# Patient Record
Sex: FEMALE | Race: BLACK OR AFRICAN AMERICAN | Employment: FULL TIME | URBAN - METROPOLITAN AREA
[De-identification: names, ages, dates, MRNs, and addresses within clinical notes are randomized per-mention and may not be internally consistent; named-entity substitution may affect disease eponyms.]

---

## 2020-12-01 ENCOUNTER — HOSPITAL ENCOUNTER (OUTPATIENT)
Dept: PHYSICAL THERAPY | Age: 50
Discharge: HOME | End: 2020-12-01
Payer: COMMERCIAL

## 2020-12-01 PROCEDURE — 97530 THERAPEUTIC ACTIVITIES: CPT

## 2020-12-01 PROCEDURE — 97162 PT EVAL MOD COMPLEX 30 MIN: CPT

## 2020-12-01 NOTE — PROGRESS NOTES
PT DAILY TREATMENT NOTE 10-18    Patient Name: Shahana Nguyen  Date:2020  : 1970  [x]  Patient  Verified  Payor: Andrew Schumacher / Plan: Hugo Rm / Product Type: Commerical /    In time:3:05  Out time:4:00  Total Treatment Time (min): 55  Visit #: 1 of 10    Medicare/BCBS Only   Total Timed Codes (min):  30 1:1 Treatment Time:  55       Treatment Area: Neck pain [M54.2]  Pain in right shoulder [M25.511]  Pain in left shoulder [M25.512]  Left hip pain [M25.552]  Right hip pain [M25.551]    SUBJECTIVE  Pain Level (0-10 scale): 5-6  Any medication changes, allergies to medications, adverse drug reactions, diagnosis change, or new procedure performed?: [x] No    [] Yes (see summary sheet for update)  Subjective functional status/changes:   [] No changes reported  See POC    OBJECTIVE    25 min [x]Eval                  []Re-Eval     30 min Therapeutic Activity:  []  See flow sheet : Patient education on therapy assessment, prognosis, expectations for therapy sessions, patient goals, neuro screen results, symptom modification with exercise, normal healing times, role of HTN and DM II in creating visual disturbances, and HEP. Rationale: to improve the patients ability to adhere to HEP and therapy sessions for increased compliance when working toward therapy goals.           With   [] TE   [x] TA   [] neuro   [] other: Patient Education: [x] Review HEP    [] Progressed/Changed HEP based on:   [] positioning   [] body mechanics   [] transfers   [] heat/ice application    [] other:      Other Objective/Functional Measures: See POC     Pain Level (0-10 scale) post treatment: 5-6    ASSESSMENT/Changes in Function: See POC    Patient will continue to benefit from skilled PT services to modify and progress therapeutic interventions, address functional mobility deficits, address ROM deficits, address strength deficits, analyze and address soft tissue restrictions, analyze and cue movement patterns, analyze and modify body mechanics/ergonomics, assess and modify postural abnormalities, address imbalance/dizziness and instruct in home and community integration to attain remaining goals.      [x]  See Plan of Care  []  See progress note/recertification  []  See Discharge Summary         Progress towards goals / Updated goals:  See POC    PLAN  [x]  Upgrade activities as tolerated     []  Continue plan of care  [x]  Update interventions per flow sheet       []  Discharge due to:_  []  Other:_      Kristine Ramirez 12/1/2020  2:54 PM    Future Appointments   Date Time Provider Judy Mayers   12/1/2020  3:00 PM Keon Osorio
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
## 2020-12-09 ENCOUNTER — HOSPITAL ENCOUNTER (OUTPATIENT)
Dept: PHYSICAL THERAPY | Age: 50
Discharge: HOME | End: 2020-12-09
Payer: COMMERCIAL

## 2020-12-09 PROCEDURE — 97014 ELECTRIC STIMULATION THERAPY: CPT

## 2020-12-09 PROCEDURE — 97110 THERAPEUTIC EXERCISES: CPT

## 2020-12-09 NOTE — PROGRESS NOTES
PT DAILY TREATMENT NOTE     Patient Name: Betty Piña  Date:2020  : 1970  [x]  Patient  Verified  Payor: Mynor Rothman / Plan: Nan Solo / Product Type: Commerical /    In time:2:20  Out time:3:20  1:1 Treatment Time (min): 60  Visit #: 2 of 10    Medicare/BCBS Only   Total Timed Codes (min):  50 1:1 Treatment Time:  50       Treatment Area: Neck pain [M54.2]  Pain in left shoulder [M25.512]  Pain in thoracic spine [M54.6]    SUBJECTIVE  Pain Level (0-10 scale): 8/10 back, 5/10 left shoulder  Any medication changes, allergies to medications, adverse drug reactions, diagnosis change, or new procedure performed?: [x] No    [] Yes (see summary sheet for update)  Subjective functional status/changes:   [] No changes reported  I saw Dr. Teresita Healy regarding the meningioma. He said surgery will be indicated, benign.  Pre-surgical visit , with surgery scheduled 2021    OBJECTIVE    Modality rationale: decrease pain and increase tissue extensibility to improve the patients ability to improve ease of mobility   Min Type Additional Details   10 [x] Estim:  [x]Unatt       []IFC  [x]Premod                        []Other:  []w/ice   [x]w/heat  Position:supine  Location: left shoulder, LB    [] Estim: []Att    []TENS instruct  []NMES                    []Other:  []w/US   []w/ice   []w/heat  Position:  Location:    []  Traction: [] Cervical       []Lumbar                       [] Prone          []Supine                       []Intermittent   []Continuous Lbs:  [] before manual  [] after manual    []  Ultrasound: []Continuous   [] Pulsed                           []1MHz   []3MHz W/cm2:  Location:    []  Iontophoresis with dexamethasone         Location: [] Take home patch   [] In clinic    []  Ice     []  heat  []  Ice massage  []  Laser   []  Anodyne Position:  Location:    []  Laser with stim  []  Other:  Position:  Location:    []  Vasopneumatic Device Pressure: [] lo [] med [] hi   Temperature: [] lo [] med [] hi   [x] Skin assessment post-treatment:  [x]intact []redness- no adverse reaction    []redness  adverse reaction:       50 min Therapeutic Exercise:  [] See flow sheet :   Rationale: increase ROM and increase strength to improve the patients ability to improve mobility, UE function,scapular sability          With   [] TE   [] TA   [] neuro   [] other: Patient Education: [x] Review HEP    [] Progressed/Changed HEP based on:   [] positioning   [] body mechanics   [] transfers   [] heat/ice application    [] other:      Other Objective/Functional Measures: initiated ex program     Pain Level (0-10 scale) post treatment: 5-6    ASSESSMENT/Changes in Function: first follow-up. Unable to WB on left UE in quadruped. Good response to PT interventions and modalities with reduction in pain      Patient will continue to benefit from skilled PT services to modify and progress therapeutic interventions, address functional mobility deficits, address ROM deficits, address strength deficits, analyze and address soft tissue restrictions, analyze and cue movement patterns, analyze and modify body mechanics/ergonomics, assess and modify postural abnormalities, address imbalance/dizziness and instruct in home and community integration to attain remaining goals. []  See Plan of Care  []  See progress note/recertification  []  See Discharge Summary         Progress towards goals / Updated goals:  - Goal: Pt to be compliant with initial HEP to improve pt's ability to independently manage symptoms. Status at last note/certification: Established and reviewed with Pt  Long Term Goals: To be accomplished in 10 treatments  - Goal: Pt to demo right seated shoulder flexion AROM of at least 150 deg to improve ease of washing walls and putting away dishes at home.   Status at last note/certification: right seated shoulder flexion 96 deg with increased pain  - Goal: Pt to report no more than 3/10 pain on average in shoulders, neck, and low back to improve quality of life. Status at last note/certification: 34/24 pain at worst, 4-5/10 pain at best  - Goal: Pt to report sitting/standing tolerance of at least one hour without increased pain to improve ease of work tasks. Status at last note/certification: sitting tolerance 20 min, standing tolerance 10-15 min  - Goal: Pt to report FOTO score of at least 61 pts to show improved function and quality of life.   Status at last note/certification: FOTO 41 pts     PLAN  []  Upgrade activities as tolerated     []  Continue plan of care  []  Update interventions per flow sheet       []  Discharge due to:_  []  Other:_      Tim Brewster, AMINA 12/9/2020  2:36 PM    Future Appointments   Date Time Provider Judy Mayers   12/11/2020  2:15 PM Krysta Granados [facility]   12/16/2020  2:15 PM [facility]   12/18/2020  2:15 PM Lucinda June [facility]   12/21/2020  2:15 PM [facility]   12/23/2020  2:15 PM Lucinda June [facility]   12/28/2020  3:00 PM Barry Burger, PT [facility]   12/30/2020 12:00 PM Barry Burger, PT [facility]

## 2020-12-11 ENCOUNTER — HOSPITAL ENCOUNTER (OUTPATIENT)
Dept: PHYSICAL THERAPY | Age: 50
Discharge: HOME | End: 2020-12-11
Payer: COMMERCIAL

## 2020-12-11 PROCEDURE — 97110 THERAPEUTIC EXERCISES: CPT

## 2020-12-11 PROCEDURE — 97014 ELECTRIC STIMULATION THERAPY: CPT

## 2020-12-11 NOTE — PROGRESS NOTES
PT DAILY TREATMENT NOTE     Patient Name: Cari Hayes  Date:2020  : 1970  [x]  Patient  Verified  Payor: Corinne Hark / Plan: Anjana Mitchell / Product Type: Commerical /    In time:  Out time:  1:1 Treatment Time (min): 51  Visit #: 3 of 10    Medicare/BCBS Only   Total Timed Codes (min):  36 1:1 Treatment Time:  6014-5173: 36       Treatment Area: Neck pain [M54.2]  Pain in left shoulder [M25.512]  Pain in thoracic spine [M54.6]    SUBJECTIVE  Pain Level (0-10 scale): 7/10  Any medication changes, allergies to medications, adverse drug reactions, diagnosis change, or new procedure performed?: [x] No    [] Yes (see summary sheet for update)  Subjective functional status/changes:   [] No changes reported  Pt reports L shoulder and back pain    OBJECTIVE    Modality rationale: decrease pain and increase tissue extensibility to improve the patients ability to improve ease of mobility   Min Type Additional Details   10 +5 min set up/removal [x] Estim:  [x]Unatt       []IFC  [x]Premod                        []Other:  []w/ice   [x]w/heat  Position:supine  Location: Left shoulder , LB    [] Estim: []Att    []TENS instruct  []NMES                    []Other:  []w/US   []w/ice   []w/heat  Position:  Location:    []  Traction: [] Cervical       []Lumbar                       [] Prone          []Supine                       []Intermittent   []Continuous Lbs:  [] before manual  [] after manual    []  Ultrasound: []Continuous   [] Pulsed                           []1MHz   []3MHz W/cm2:  Location:    []  Iontophoresis with dexamethasone         Location: [] Take home patch   [] In clinic    []  Ice     []  heat  []  Ice massage  []  Laser   []  Anodyne Position:  Location:    []  Laser with stim  []  Other:  Position:  Location:    []  Vasopneumatic Device Pressure:       [] lo [] med [] hi   Temperature: [] lo [] med [] hi   [] Skin assessment post-treatment:  []intact []redness- no adverse reaction    []redness  adverse reaction:       36 min Therapeutic Exercise:  [x] See flow sheet :   Rationale: increase ROM and increase strength to improve the patients ability to improve mobility, UE function,scapular stability          With   [x] TE   [] TA   [] neuro   [] other: Patient Education: [x] Review HEP    [] Progressed/Changed HEP based on:   [] positioning   [] body mechanics   [] transfers   [] heat/ice application    [] other:      Other Objective/Functional Measures: Added: supine Y, T, standing rows and extension, and doorway stretch (low)    Pain Level (0-10 scale) post treatment: 7    ASSESSMENT/Changes in Function: Skilled verbal cues provided to perform therex with proper body mechanics. Pt reports partial compliance with HEP. Patient will continue to benefit from skilled PT services to modify and progress therapeutic interventions, address functional mobility deficits, address ROM deficits, address strength deficits, analyze and address soft tissue restrictions, analyze and cue movement patterns, analyze and modify body mechanics/ergonomics, assess and modify postural abnormalities, address imbalance/dizziness and instruct in home and community integration to attain remaining goals. []  See Plan of Care  []  See progress note/recertification  []  See Discharge Summary         Progress towards goals / Updated goals:  - Goal: Pt to be compliant with initial HEP to improve pt's ability to independently manage symptoms. Status at last note/certification: Established and reviewed with Pt  Current: Pt reports partial compliance with HEP 12/11/2020  Long Term Goals: To be accomplished in 10 treatments  - Goal: Pt to demo right seated shoulder flexion AROM of at least 150 deg to improve ease of washing walls and putting away dishes at home.   Status at last note/certification: right seated shoulder flexion 96 deg with increased pain  - Goal: Pt to report no more than 3/10 pain on average in shoulders, neck, and low back to improve quality of life. Status at last note/certification: 13/18 pain at worst, 4-5/10 pain at best  - Goal: Pt to report sitting/standing tolerance of at least one hour without increased pain to improve ease of work tasks. Status at last note/certification: sitting tolerance 20 min, standing tolerance 10-15 min  - Goal: Pt to report FOTO score of at least 61 pts to show improved function and quality of life.   Status at last note/certification: FOTO 41 pts     PLAN  [x]  Upgrade activities as tolerated     [x]  Continue plan of care  []  Update interventions per flow sheet       []  Discharge due to:_  []  Other:_      Maya Madera, PT 12/11/2020  1604 pm    Future Appointments   Date Time Provider Judy Mayers   12/16/2020  2:15 PM Azam [facility]   12/18[facility]2020  [facility]   12/21/2020  2:15 PM [facility] JUDD [facility]   12/23/2020  2:15 PM [facility] JUDD [facility]   12/28/2020  3:00 PM Michelle Burger, PT [facility]   12/30/2020 12:00 PM Michelle Burger, PT [facility]

## 2020-12-16 ENCOUNTER — HOSPITAL ENCOUNTER (OUTPATIENT)
Dept: PHYSICAL THERAPY | Age: 50
Discharge: HOME | End: 2020-12-16
Payer: COMMERCIAL

## 2020-12-16 PROCEDURE — 97014 ELECTRIC STIMULATION THERAPY: CPT

## 2020-12-16 PROCEDURE — 97110 THERAPEUTIC EXERCISES: CPT

## 2020-12-16 PROCEDURE — 97112 NEUROMUSCULAR REEDUCATION: CPT

## 2020-12-16 NOTE — PROGRESS NOTES
PT DAILY TREATMENT NOTE 10-18    Patient Name: Marquita Nichole  Date:2020  : 1970  [x]  Patient  Verified  Payor: Behzad Tracey / Plan: Raisa Newell / Product Type: Commerical /    In time:2:19  Out time:3:18  Total Treatment Time (min): 59  Visit #: 4 of 10    Medicare/BCBS Only   Total Timed Codes (min):  44 1:1 Treatment Time:  42       Treatment Area: Neck pain [M54.2]  Pain in left shoulder [M25.512]  Pain in thoracic spine [M54.6]    SUBJECTIVE  Pain Level (0-10 scale): 5-6  Any medication changes, allergies to medications, adverse drug reactions, diagnosis change, or new procedure performed?: [x] No    [] Yes (see summary sheet for update)  Subjective functional status/changes:   [] No changes reported  \"I am gradually feeling a bit better. \"    OBJECTIVE           Modality rationale: decrease pain and increase tissue extensibility to improve the patients ability to improve ease of mobility   Min Type Additional Details    10 + 5 min set up [x]? Estim:  [x]? Unatt       []? IFC  [x]? Premod                        []?Other:  []?w/ice   [x]?w/heat  Position:supine  Location: Left shoulder , LB      []? Estim: []? Att    []? TENS instruct  []? NMES                    []?Other:  []?w/US   []?w/ice   []?w/heat  Position:  Location:      []? Traction: []? Cervical       []? Lumbar                       []? Prone          []? Supine                       []?Intermittent   []? Continuous Lbs:  []? before manual  []? after manual      []? Ultrasound: []? Continuous   []? Pulsed                           []? 1MHz   []? 3MHz W/cm2:  Location:      []? Iontophoresis with dexamethasone         Location: []? Take home patch   []? In clinic      []? Ice     []?  heat  []? Ice massage  []? Laser   []? Anodyne Position:  Location:      []? Laser with stim  []? Other:  Position:  Location:      []? Vasopneumatic Device Pressure:       []? lo []? med []? hi   Temperature: []? lo []? med []? hi    []? Skin assessment post-treatment:  []?intact []? redness- no adverse reaction    []? redness  adverse reaction:        27 min Therapeutic Exercise:  [x] See flow sheet :   Rationale: increase ROM and increase strength to improve LE/UE strength/mobility and cervical/lumbar mobility to improve ease of ADLs and gait. 17 min Neuromuscular Re-education:  [x]  See flow sheet :   Rationale: increase strength, improve coordination, improve balance and increase proprioception  to improve the patients ability to perform functional tasks with improved abdominal brace utilization, improved control, and decreased risk for falls. With   [x] TE   [] TA   [x] neuro   [] other: Patient Education: [x] Review HEP    [] Progressed/Changed HEP based on:   [] positioning   [] body mechanics   [] transfers   [] heat/ice application    [] other:      Other Objective/Functional Measures: -Increased focus on low back symptoms today     Pain Level (0-10 scale) post treatment: 5-6    ASSESSMENT/Changes in Function: Emphasis of today's session on pt education concerning the nature of her symptoms and their likely resolution with continued return to controlled stability/mobility. She requires tactile cuing to isolate posterior pelvic tilt. Patient will continue to benefit from skilled PT services to modify and progress therapeutic interventions, address functional mobility deficits, address ROM deficits, address strength deficits, analyze and address soft tissue restrictions, analyze and cue movement patterns, analyze and modify body mechanics/ergonomics, assess and modify postural abnormalities and address imbalance/dizziness to attain remaining goals. []  See Plan of Care  []  See progress note/recertification  []  See Discharge Summary         Progress towards goals / Updated goals:  - Goal: Pt to be compliant with initial HEP to improve pt's ability to independently manage symptoms.   Status at last note/certification: Established and reviewed with Pt  Current: Pt reports partial compliance with HEP 12/11/2020  Long Term Goals: To be accomplished in 10 treatments  - Goal: Pt to demo right seated shoulder flexion AROM of at least 150 deg to improve ease of washing walls and putting away dishes at home. Status at last note/certification: right seated shoulder flexion 96 deg with increased pain  Current:  - Goal: Pt to report no more than 3/10 pain on average in shoulders, neck, and low back to improve quality of life. Status at last note/certification: 10/10 pain at worst, 4-5/10 pain at best  Current:  - Goal: Pt to report sitting/standing tolerance of at least one hour without increased pain to improve ease of work tasks. Status at last note/certification: sitting tolerance 20 min, standing tolerance 10-15 min  Current:  - Goal: Pt to report FOTO score of at least 61 pts to show improved function and quality of life.   Status at last note/certification: PZGT 23 LTY   Current:    PLAN  [x]  Upgrade activities as tolerated     [x]  Continue plan of care  []  Update interventions per flow sheet       []  Discharge due to:_  []  Other:_      Kristine Ramirez 12/16/2020  2:29 PM    Future Appointments   Date Time Provider Judy Mayers   12/18/2020  2:15 PM Pratima [facility]   12/21/2020  2:15 PM [facility]   12/23/2020  2:15 PM Max [facility]   12/28/2020  3:00 PM Africa Burger, PT [facility]   12/30/2020 12:00 PM Africa Burger, PT [facility]

## 2020-12-18 ENCOUNTER — HOSPITAL ENCOUNTER (OUTPATIENT)
Dept: PHYSICAL THERAPY | Age: 50
Discharge: HOME | End: 2020-12-18
Payer: COMMERCIAL

## 2020-12-18 PROCEDURE — 97112 NEUROMUSCULAR REEDUCATION: CPT

## 2020-12-18 PROCEDURE — 97110 THERAPEUTIC EXERCISES: CPT

## 2020-12-18 PROCEDURE — 97014 ELECTRIC STIMULATION THERAPY: CPT

## 2020-12-18 NOTE — PROGRESS NOTES
PT DAILY TREATMENT NOTE 10-18    Patient Name: Amna Mcwilliams  Date:2020  : 1970  [x]  Patient  Verified  Payor: Alivia Leal / Plan: Kendy Iniguez / Product Type: Commerical /    In time:2:14  Out time:3:15  Total Treatment Time (min): 61  Visit #: 5 of 10    Medicare/BCBS Only   Total Timed Codes (min):  50 1:1 Treatment Time:  49       Treatment Area: Neck pain [M54.2]  Pain in left shoulder [M25.512]  Pain in thoracic spine [M54.6]    SUBJECTIVE  Pain Level (0-10 scale): 6  Any medication changes, allergies to medications, adverse drug reactions, diagnosis change, or new procedure performed?: [x] No    [] Yes (see summary sheet for update)  Subjective functional status/changes:   [] No changes reported  \"My left shoulder was feeling pretty good yesterday. Collette Ruts \"    OBJECTIVE              Modality rationale: decrease pain and increase tissue extensibility to improve the patients ability to improve ease of mobility   Min Type Additional Details     10 + 1 min set up [x]? ? Estim:  [x]? ? Unatt       []? ?IFC  [x]? ?Premod                        []? ? Other:  []??w/ice   [x]? ?w/heat  Position:supine  Location: Left shoulder , LB       []? ? Estim: []??Att    []? ?TENS instruct  []? ?NMES                    []? ? Other:  []??w/US   []? ?w/ice   []? ?w/heat  Position:  Location:       []? ?  Traction: []?? Cervical       []? ?Lumbar                       []? ? Prone          []? ?Supine                       []? ?Intermittent   []? ? Continuous Lbs:  []?? before manual  []? ? after manual       []? ?  Ultrasound: []??Continuous   []? ? Pulsed                           []? ?1MHz   []? ? 3MHz W/cm2:  Location:       []? ?  Iontophoresis with dexamethasone         Location: []?? Take home patch   []? ? In clinic       []? ?  Ice     []? ?  heat  []? ?  Ice massage  []? ?  Laser   []? ?  Anodyne Position:  Location:       []? ?  Laser with stim  []? ?  Other:  Position:  Location:       []? ?  Vasopneumatic Device Pressure:       []? ? lo []? ? med []?? hi   Temperature: []?? lo []? ? med []?? hi     [x]? ? Skin assessment post-treatment:  [x]? ? intact [x]? ? redness- no adverse reaction    []? ?redness  adverse reaction:       32 min Therapeutic Exercise:  [x] See flow sheet :   Rationale: increase ROM and increase strength to improve the patients ability to perform ADLs with improved shoulder/elbow/hip/knee strength and mobility. 18 min Neuromuscular Re-education:  [x]  See flow sheet :   Rationale: increase ROM, increase strength, improve coordination, improve balance and increase proprioception  to improve the patients ability to perform overhead functional lifts with improved scapular stabilization, improved abdominal brace usage, improved core stability, and neutral cervical posture. With   [x] TE   [] TA   [] neuro   [] other: Patient Education: [x] Review HEP    [] Progressed/Changed HEP based on:   [] positioning   [] body mechanics   [] transfers   [] heat/ice application    [] other:      Other Objective/Functional Measures: -Seated left shoulder flexion 129 deg without pain     Pain Level (0-10 scale) post treatment: 6    ASSESSMENT/Changes in Function: Patient continues to report increased pain with standing activities; her low back pain is aggravated more easily than her left shoulder pain. She is able to report gradual reduction in pain level since starting therapy. She demonstrates improved left shoulder flexion today compared to evaluation.     Patient will continue to benefit from skilled PT services to modify and progress therapeutic interventions, address functional mobility deficits, address ROM deficits, address strength deficits, analyze and address soft tissue restrictions, analyze and cue movement patterns, analyze and modify body mechanics/ergonomics, assess and modify postural abnormalities, address imbalance/dizziness and instruct in home and community integration to attain remaining goals.     []  See Plan of Care  []  See progress note/recertification  []  See Discharge Summary         Progress towards goals / Updated goals:  - Goal: Pt to be compliant with initial HEP to improve pt's ability to independently manage symptoms. Status at last note/certification: Established and reviewed with Pt  Current: Pt reports partial compliance with HEP 12/11/2020  Long Term Goals: To be accomplished in 10 treatments  - Goal: Pt to demo left seated shoulder flexion AROM of at least 150 deg to improve ease of washing walls and putting away dishes at home. Status at last note/certification: left seated shoulder flexion 96 deg with increased pain  Current: progressing, 129 deg  - Goal: Pt to report no more than 3/10 pain on average in shoulders, neck, and low back to improve quality of life. Status at last note/certification: 10/10 pain at worst, 4-5/10 pain at best  Current: progressing, 7/10 pain at worst in past week, 6/10 on average  - Goal: Pt to report sitting/standing tolerance of at least one hour without increased pain to improve ease of work tasks. Status at last note/certification: sitting tolerance 20 min, standing tolerance 10-15 min  Current: progressing, 20 min sitting tolerance, 5-10 min standing tolerance  - Goal: Pt to report FOTO score of at least 61 pts to show improved function and quality of life.   Status at last note/certification: HWKV 69 PCL   Current: reassess at MD note    PLAN  [x]  Upgrade activities as tolerated     [x]  Continue plan of care  []  Update interventions per flow sheet       []  Discharge due to:_  []  Other:_      Margaret Nieto 12/18/2020  8:09 AM    Future Appointments   Date Time Provider Judy Mayers   12/18/2020  2:15 PM Alcario Hurry [facility]   12/23/2020  2:15 PM Alcario Hurry [facility]   12/24/2020  9:45 AM Earline Nix [facility]   12/28/2020  3:00 PM Charisse Burger PT [facility]   12/30/2020 12:00 PM Jennyfer Magda Dutton, PT [id number]

## 2020-12-21 ENCOUNTER — APPOINTMENT (OUTPATIENT)
Dept: PHYSICAL THERAPY | Age: 50
End: 2020-12-21
Payer: COMMERCIAL

## 2020-12-23 ENCOUNTER — HOSPITAL ENCOUNTER (OUTPATIENT)
Dept: PHYSICAL THERAPY | Age: 50
Discharge: HOME | End: 2020-12-23
Payer: COMMERCIAL

## 2020-12-23 PROCEDURE — 97140 MANUAL THERAPY 1/> REGIONS: CPT

## 2020-12-23 PROCEDURE — 97014 ELECTRIC STIMULATION THERAPY: CPT

## 2020-12-23 PROCEDURE — 97112 NEUROMUSCULAR REEDUCATION: CPT

## 2020-12-23 PROCEDURE — 97110 THERAPEUTIC EXERCISES: CPT

## 2020-12-23 NOTE — PROGRESS NOTES
PT DAILY TREATMENT NOTE 10-18    Patient Name: Sanna Merchant  Date:2020  : 1970  [x]  Patient  Verified  Payor: Sharda Herrmann / Plan: Cher Nathan / Product Type: Commerical /    In time:2:16  Out time:3:14  Total Treatment Time (min): 58  Visit #: 6 of 10    Medicare/BCBS Only   Total Timed Codes (min):  48 1:1 Treatment Time:  44       Treatment Area: Neck pain [M54.2]  Pain in left shoulder [M25.512]  Pain in thoracic spine [M54.6]    SUBJECTIVE  Pain Level (0-10 scale): 5  Any medication changes, allergies to medications, adverse drug reactions, diagnosis change, or new procedure performed?: [x] No    [] Yes (see summary sheet for update)  Subjective functional status/changes:   [] No changes reported  \"I am feeling pretty good today, just a little stiff in my low back\"    OBJECTIVE    Modality rationale: decrease pain and increase tissue extensibility to improve the patients ability to perform ADLs and rest comfortably following therapy.     Min Type Additional Details   10 [x] Estim:  [x]Unatt       [x]IFC  []Premod                        []Other:  []w/ice   [x]w/heat  Position: prone  Location: low back    [] Estim: []Att    []TENS instruct  []NMES                    []Other:  []w/US   []w/ice   []w/heat  Position:   Location:     []  Traction: [] Cervical       []Lumbar                       [] Prone          []Supine                       []Intermittent   []Continuous Lbs:  [] before manual  [] after manual    []  Ultrasound: []Continuous   [] Pulsed                           []1MHz   []3MHz W/cm2:  Location:    []  Iontophoresis with dexamethasone         Location: [] Take home patch   [] In clinic    []  Ice     []  heat  []  Ice massage  []  Laser   []  Anodyne Position:   Location:     []  Laser with stim  []  Other:  Position:  Location:    []  Vasopneumatic Device Pressure:       [] lo [] med [] hi   Temperature: [] lo [] med [] hi   [x] Skin assessment post-treatment:  [x]intact []redness- no adverse reaction    []redness  adverse reaction:     18 min Therapeutic Exercise:  [x] See flow sheet :   Rationale: increase ROM and increase strength to improve LE strength/mobility and  lumbar mobility to improve ease of ADLs and gait. 18 min Neuromuscular Re-education:  [x]  See flow sheet :   Rationale: increase strength, improve coordination, improve balance and increase proprioception  to improve the patients ability to perform functional tasks with improved abdominal brace utilization, improved control, and decreased risk for falls. 12 min Manual Therapy:  STM and TrP release to B lumbar paraspinals and B Q/L; Unilateral PA Mobs, applied to both left/right L2-L5 segments   The manual therapy interventions were performed at a separate and distinct time from the therapeutic activities interventions. Rationale: decrease pain, increase ROM, increase tissue extensibility, decrease trigger points, and increase postural awareness to improve the patient's ability to complete standing ADLs with improved tolerance. With   [] TE   [] TA   [x] neuro   [] other: Patient Education: [x] Review HEP    [] Progressed/Changed HEP based on:   [] positioning   [] body mechanics   [] transfers   [] heat/ice application    [] other:      Other Objective/Functional Measures: -Initiated manual therapy today   Pain Level (0-10 scale) post treatment: 3-4    ASSESSMENT/Changes in Function: Patient with audible, non painful crepitation observed during left PA mobs. She responds very favorably to manual therapy today, demonstrating improved exercise tolerance after this modality.     Patient will continue to benefit from skilled PT services to modify and progress therapeutic interventions, address functional mobility deficits, address ROM deficits, address strength deficits, analyze and address soft tissue restrictions, analyze and cue movement patterns, analyze and modify body mechanics/ergonomics, assess and modify postural abnormalities and address imbalance/dizziness to attain remaining goals. []  See Plan of Care  []  See progress note/recertification  []  See Discharge Summary         Progress towards goals / Updated goals:  - Goal: Pt to be compliant with initial HEP to improve pt's ability to independently manage symptoms. Status at last note/certification: Established and reviewed with Pt  Current: Pt reports partial compliance with HEP 12/11/2020  Long Term Goals: To be accomplished in 10 treatments  - Goal: Pt to demo left seated shoulder flexion AROM of at least 150 deg to improve ease of washing walls and putting away dishes at home. Status at last note/certification: left seated shoulder flexion 96 deg with increased pain  Current: progressing, 129 deg  - Goal: Pt to report no more than 3/10 pain on average in shoulders, neck, and low back to improve quality of life. Status at last note/certification: 10/10 pain at worst, 4-5/10 pain at best  Current: progressing, 7/10 pain at worst in past week, 6/10 on average  - Goal: Pt to report sitting/standing tolerance of at least one hour without increased pain to improve ease of work tasks. Status at last note/certification: sitting tolerance 20 min, standing tolerance 10-15 min  Current: progressing, 20 min sitting tolerance, 5-10 min standing tolerance  - Goal: Pt to report FOTO score of at least 61 pts to show improved function and quality of life.   Status at last note/certification: NKUE 98 GHQ   Current: reassess at MD note    PLAN  [x]  Upgrade activities as tolerated     [x]  Continue plan of care  []  Update interventions per flow sheet       []  Discharge due to:_  []  Other:_      Bimal Loza 12/23/2020  9:38 AM    Future Appointments   Date Time Provider Judy Mayers   12/23/2020  2:15 PM Guillermina [facility]   12/24/2020  9:45 AM [facility]   12/28/2020  3:00 PM Jennyfer Tong Back, PT [facility]   12/30/2020 12:00 PM Tong Burger, PT [facility]

## 2020-12-24 ENCOUNTER — HOSPITAL ENCOUNTER (OUTPATIENT)
Dept: PHYSICAL THERAPY | Age: 50
Discharge: HOME | End: 2020-12-24
Attending: ORTHOPAEDIC SURGERY
Payer: COMMERCIAL

## 2020-12-24 PROCEDURE — 97014 ELECTRIC STIMULATION THERAPY: CPT

## 2020-12-24 PROCEDURE — 97112 NEUROMUSCULAR REEDUCATION: CPT

## 2020-12-24 PROCEDURE — 97140 MANUAL THERAPY 1/> REGIONS: CPT

## 2020-12-24 PROCEDURE — 97110 THERAPEUTIC EXERCISES: CPT

## 2020-12-24 NOTE — PROGRESS NOTES
PT DAILY TREATMENT NOTE 10-18    Patient Name: Nancy Gay  Date:2020  : 1970  [x]  Patient  Verified  Payor: Marissa Meraz / Plan: Jennifer Cleaning / Product Type: Commerical /    In time:9:45  Out time:10:45  Total Treatment Time (min): 60  Visit #: 7 of 10    Medicare/BCBS Only   Total Timed Codes (min):  50 1:1 Treatment Time:  45       Treatment Area: Neck pain [M54.2]  Pain in left shoulder [M25.512]  Pain in thoracic spine [M54.6]    SUBJECTIVE  Pain Level (0-10 scale): 3-4  Any medication changes, allergies to medications, adverse drug reactions, diagnosis change, or new procedure performed?: [x] No    [] Yes (see summary sheet for update)  Subjective functional status/changes:   [] No changes reported  \"Still feeling much better since yesterday's session. \"    OBJECTIVE    Modality rationale: decrease pain and increase tissue extensibility to improve the patients ability to perform ADLs and rest comfortably following therapy. Min Type Additional Details    10 [x]? Estim:  [x]? Unatt       [x]? IFC  []? Premod                        []?Other:  []?w/ice   [x]?w/heat  Position: prone  Location: low back      []? Estim: []? Att    []? TENS instruct  []? NMES                    []?Other:  []?w/US   []?w/ice   []?w/heat  Position:   Location:       []? Traction: []? Cervical       []? Lumbar                       []? Prone          []? Supine                       []?Intermittent   []? Continuous Lbs:  []? before manual  []? after manual      []? Ultrasound: []? Continuous   []? Pulsed                           []? 1MHz   []? 3MHz W/cm2:  Location:      []? Iontophoresis with dexamethasone         Location: []? Take home patch   []? In clinic      []? Ice     []?  heat  []? Ice massage  []? Laser   []? Anodyne Position:   Location:       []? Laser with stim  []? Other:  Position:  Location:      []?   Vasopneumatic Device Pressure:       []? lo []? med []? hi   Temperature: []? lo []? med []? hi    [x]? Skin assessment post-treatment:  [x]? intact []? redness- no adverse reaction    []? redness  adverse reaction:       20 min Therapeutic Exercise:  [x] See flow sheet :   Rationale: increase ROM and increase strength to improve LE strength/mobility and  lumbar mobility to improve ease of ADLs and gait. 15 min Neuromuscular Re-education:  [x]  See flow sheet :   Rationale: increase strength, improve coordination, improve balance and increase proprioception  to improve the patients ability to perform functional tasks with improved abdominal brace utilization, improved control, and decreased risk for falls. 15 min Manual Therapy:  STM and TrP release to B lumbar paraspinals and B Q/L; Unilateral PA Mobs, applied to both left/right L2-L5 segments; TrP release to left oblique musculature with associated crosshands stretch   The manual therapy interventions were performed at a separate and distinct time from the therapeutic activities interventions. Rationale: decrease pain, increase ROM, increase tissue extensibility, decrease trigger points, and increase postural awareness to improve the patient's ability to complete standing ADLs with improved tolerance. With   [] TE   [] TA   [] neuro   [] other: Patient Education: [x] Review HEP    [] Progressed/Changed HEP based on:   [] positioning   [] body mechanics   [] transfers   [] heat/ice application    [] other:      Other Objective/Functional Measures: -Continued manual therapy per pt's positive response     Pain Level (0-10 scale) post treatment: 2    ASSESSMENT/Changes in Function: Patient reports improved quality of sleep following yesterday's session. Repeated most of therapy interventions with graded exposure to squatting/lifting to improve activity tolerance.     Patient will continue to benefit from skilled PT services to modify and progress therapeutic interventions, address functional mobility deficits, address ROM deficits, address strength deficits, analyze and address soft tissue restrictions, analyze and cue movement patterns, analyze and modify body mechanics/ergonomics, assess and modify postural abnormalities and address imbalance/dizziness to attain remaining goals. []  See Plan of Care  []  See progress note/recertification  []  See Discharge Summary         Progress towards goals / Updated goals:  - Goal: Pt to be compliant with initial HEP to improve pt's ability to independently manage symptoms. Status at last note/certification: Established and reviewed with Pt  Current: met, pt reports compliance 12/11/2020  Long Term Goals: To be accomplished in 10 treatments  - Goal: Pt to demo left seated shoulder flexion AROM of at least 150 deg to improve ease of washing walls and putting away dishes at home. Status at last note/certification: left seated shoulder flexion 96 deg with increased pain  Current: progressing, 129 deg  - Goal: Pt to report no more than 3/10 pain on average in shoulders, neck, and low back to improve quality of life. Status at last note/certification: 10/10 pain at worst, 4-5/10 pain at best  Current: progressing, 7/10 pain at worst in past week, 6/10 on average  - Goal: Pt to report sitting/standing tolerance of at least one hour without increased pain to improve ease of work tasks. Status at last note/certification: sitting tolerance 20 min, standing tolerance 10-15 min  Current: progressing, 20 min sitting tolerance, 5-10 min standing tolerance  - Goal: Pt to report FOTO score of at least 61 pts to show improved function and quality of life.   Status at last note/certification: UNGJ 90 KBQ   Current: reassess at MD note    PLAN  [x]  Upgrade activities as tolerated     [x]  Continue plan of care  []  Update interventions per flow sheet       []  Discharge due to:_  []  Other:_      Vinny Dan 12/24/2020  9:50 AM    Future Appointments   Date Time Provider Judy Mayers 12/28/2020  3:00 PM Rigoberto Burger, PT [facility]   12/30/2020 12:00 PM Rigoberto Burger, PT [facility]

## 2020-12-28 ENCOUNTER — APPOINTMENT (OUTPATIENT)
Dept: PHYSICAL THERAPY | Age: 50
End: 2020-12-28
Payer: COMMERCIAL

## 2020-12-29 ENCOUNTER — HOSPITAL ENCOUNTER (OUTPATIENT)
Dept: PHYSICAL THERAPY | Age: 50
Discharge: HOME | End: 2020-12-29
Attending: ORTHOPAEDIC SURGERY
Payer: COMMERCIAL

## 2020-12-29 PROCEDURE — 97112 NEUROMUSCULAR REEDUCATION: CPT

## 2020-12-29 PROCEDURE — 97014 ELECTRIC STIMULATION THERAPY: CPT

## 2020-12-29 PROCEDURE — 97140 MANUAL THERAPY 1/> REGIONS: CPT

## 2020-12-29 PROCEDURE — 97530 THERAPEUTIC ACTIVITIES: CPT

## 2020-12-29 PROCEDURE — 97110 THERAPEUTIC EXERCISES: CPT
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
## 2020-12-30 ENCOUNTER — HOSPITAL ENCOUNTER (OUTPATIENT)
Dept: PHYSICAL THERAPY | Age: 50
Discharge: HOME | End: 2020-12-30
Attending: ORTHOPAEDIC SURGERY
Payer: COMMERCIAL

## 2020-12-30 PROCEDURE — 97140 MANUAL THERAPY 1/> REGIONS: CPT

## 2020-12-30 PROCEDURE — 97112 NEUROMUSCULAR REEDUCATION: CPT

## 2020-12-30 PROCEDURE — 97110 THERAPEUTIC EXERCISES: CPT

## 2020-12-30 NOTE — PROGRESS NOTES
PT DAILY TREATMENT NOTE     Patient Name: Bronwyn Nava  Date:2020  : 1970  [x]  Patient  Verified  Payor: Harpreet Chen / Plan: Sadiq Tang / Product Type: Commerical /    In time:12:00  Out time:12:45  Total Treatment Time (min): 45  Visit #: 1 of 10    Medicare/BCBS Only   Total Timed Codes (min):  45 1:1 Treatment Time:  45       Treatment Area: Neck pain [M54.2]  Pain in left shoulder [M25.512]  Pain in thoracic spine [M54.6]    SUBJECTIVE  Pain Level (0-10 scale): 3/10  Any medication changes, allergies to medications, adverse drug reactions, diagnosis change, or new procedure performed?: [x] No    [] Yes (see summary sheet for update)  Subjective functional status/changes:   [] No changes reported  \"I did well after yesterday. It definitely is helping. \"    OBJECTIVE    25 min Therapeutic Exercise:  [] See flow sheet :   Rationale: increase ROM and increase strength to improve the patients ability to increase ease of ADLs    10 min Neuromuscular Re-education:  []  See flow sheet :   Rationale: increase ROM, increase strength and increase proprioception  to improve the patients ability to improve cervico-scapular stability for ease of ADLs    10 min Manual Therapy:  Prone STM, TrP release to B L/S paraspinals   The manual therapy interventions were performed at a separate and distinct time from the therapeutic activities interventions. Rationale: increase ROM, increase tissue extensibility and decrease trigger points to improve lumbar mobility and improve sleep          With   [] TE   [] TA   [] neuro   [] other: Patient Education: [x] Review HEP    [] Progressed/Changed HEP based on:   [] positioning   [] body mechanics   [] transfers   [] heat/ice application    [] other:      Other Objective/Functional Measures: Performed exercises per flow sheet.      Pain Level (0-10 scale) post treatment: 2/10    ASSESSMENT/Changes in Function: Pt steadily progressing with skilled therapy, continuing to note decreasing pain levels, improved mobility, and increases in functional use of left UE. Will continue to progress program per Pt tolerance to return Pt to premorbid status without pain or limitations. Patient will continue to benefit from skilled PT services to address functional mobility deficits, address ROM deficits, address strength deficits, analyze and address soft tissue restrictions, analyze and cue movement patterns, analyze and modify body mechanics/ergonomics, assess and modify postural abnormalities and instruct in home and community integration to attain remaining goals. []  See Plan of Care  []  See progress note/recertification  []  See Discharge Summary         Progress towards goals / Updated goals:  - Goal: Pt to demo left seated shoulder flexion AROM of at least 150 deg to improve ease of washing walls and putting away dishes at home. Status at last note/certification: Sh XERX 290 deg  Current:   - Goal: Pt to report no more than 3/10 pain on average in shoulders, neck, and low back to improve quality of life. Status at last note/certification: 5/10 pain at worst in past week  Current:   - Goal: Pt to report sitting/standing tolerance of at least one hour without increased pain to improve ease of work tasks. Status at last note/certification: 20 min sitting tolerance, 5-10 min standing tolerance  Current:   - Goal: Pt to report FOTO score of at least 61 pts to show improved function and quality of life.   Status at last note/certification: DLCW 85 JNC   Current:     PLAN  [x]  Upgrade activities as tolerated     []  Continue plan of care  []  Update interventions per flow sheet       []  Discharge due to:_  []  Other:_      Summer Burger, PT 12/30/2020  12:37 PM    Future Appointments   Date Time Provider Judy Mayers   1/6/2021  2:15 PM Summer Burger, [facility]   1/8/2021  2:15 PM [facility]   1/13/2021  2:15 PM Rebeca Burger, [facility]   1/15/2021  2:15 PM Sajan Wei [facility]   1/20/2021  2:15 PM Rebeca Burger, [facility]   1/22/2021  2:15 PM [place], [address]

## 2021-01-06 ENCOUNTER — APPOINTMENT (OUTPATIENT)
Dept: PHYSICAL THERAPY | Age: 51
End: 2021-01-06
Payer: COMMERCIAL

## 2021-01-07 ENCOUNTER — HOSPITAL ENCOUNTER (OUTPATIENT)
Dept: PHYSICAL THERAPY | Age: 51
Discharge: HOME | End: 2021-01-07
Payer: COMMERCIAL

## 2021-01-07 PROCEDURE — 97110 THERAPEUTIC EXERCISES: CPT

## 2021-01-07 PROCEDURE — 97112 NEUROMUSCULAR REEDUCATION: CPT

## 2021-01-07 PROCEDURE — 97014 ELECTRIC STIMULATION THERAPY: CPT

## 2021-01-07 NOTE — PROGRESS NOTES
PT DAILY TREATMENT NOTE     Patient Name: Kira Magana  Date:2021  : 1970  [x]  Patient  Verified  Payor: Geneva Macdonald / Plan: Cat Guy / Product Type: Commerical /    In time:1120  Out time:1215  Total Treatment Time (min): 55  Visit #: 2 of 10    Treatment Area: Neck pain [M54.2]  Pain in left shoulder [M25.512]  Pain in thoracic spine [M54.6]    SUBJECTIVE  Pain Level (0-10 scale): 3  Any medication changes, allergies to medications, adverse drug reactions, diagnosis change, or new procedure performed?: [x] No    [] Yes (see summary sheet for update)  Subjective functional status/changes:   [] No changes reported  Patient reports her hip is feeling a little stiff today. Her shoulder is feeling a lot better but her low back still bothers her quite a bit.     OBJECTIVE    Modality rationale: decrease pain and increase tissue extensibility to improve the patients ability to perform ADLs   Min Type Additional Details   10 [x] Estim:  [x]Unatt       [x]IFC  []Premod                        []Other:  []w/ice   [x]w/heat  Position: supine  Location: low back    [] Estim: []Att    []TENS instruct  []NMES                    []Other:  []w/US   []w/ice   []w/heat  Position:  Location:    []  Traction: [] Cervical       []Lumbar                       [] Prone          []Supine                       []Intermittent   []Continuous Lbs:  [] before manual  [] after manual    []  Ultrasound: []Continuous   [] Pulsed                           []1MHz   []3MHz W/cm2:  Location:    []  Iontophoresis with dexamethasone         Location: [] Take home patch   [] In clinic    []  Ice     []  heat  []  Ice massage  []  Laser   []  Anodyne Position:  Location:    []  Laser with stim  []  Other:  Position:  Location:    []  Vasopneumatic Device Pressure:       [] lo [] med [] hi   Temperature: [] lo [] med [] hi   [x] Skin assessment post-treatment:  [x]intact []redness- no adverse reaction []redness  adverse reaction:     13 min Therapeutic Exercise:  [] See flow sheet :   Rationale: increase ROM and increase strength to improve the patients ability to increase activity tolerance and reduce pain. 32 min Neuromuscular Re-education:  []  See flow sheet : core stabilization   Rationale: increase strength, improve coordination, improve balance and increase proprioception  to improve the patients ability to perform household duties with less pain. With   [] TE   [] TA   [] neuro   [] other: Patient Education: [x] Review HEP    [] Progressed/Changed HEP based on:   [] positioning   [] body mechanics   [] transfers   [] heat/ice application    [] other:      Other Objective/Functional Measures: added paloff press for core stability    Pain Level (0-10 scale) post treatment: 3    ASSESSMENT/Changes in Function: Patient required cuing to avoid knee hyperextension and widen RENETTA with core stability exercises. Focused most of session on core stability as patient is most limited due to back pain. She stated feeling better following the exercises. Patient plans to make next Friday her last session since she is having surgery the following week. Patient will continue to benefit from skilled PT services to modify and progress therapeutic interventions, address functional mobility deficits, address ROM deficits, address strength deficits, analyze and address soft tissue restrictions, analyze and cue movement patterns, analyze and modify body mechanics/ergonomics, assess and modify postural abnormalities, address imbalance/dizziness and instruct in home and community integration to attain remaining goals. []  See Plan of Care  []  See progress note/recertification  []  See Discharge Summary         Progress towards goals / Updated goals:  - Goal: Pt to demo left seated shoulder flexion AROM of at least 150 deg to improve ease of washing walls and putting away dishes at home.   Status at last note/certification:  UIXG 822 deg  Current:   - Goal: Pt to report no more than 3/10 pain on average in shoulders, neck, and low back to improve quality of life. Status at last note/certification: 5/10 pain at worst in past week  Current:   - Goal: Pt to report sitting/standing tolerance of at least one hour without increased pain to improve ease of work tasks. Status at last note/certification: 20 min sitting tolerance, 5-10 min standing tolerance  Current:   - Goal: Pt to report FOTO score of at least 61 pts to show improved function and quality of life.   Status at last note/certification: QAKP 68 HHL   Current:     PLAN  [x]  Upgrade activities as tolerated     [x]  Continue plan of care  []  Update interventions per flow sheet       []  Discharge due to:_  []  Other:_      Josefina Oliveros [facility] 1/7/2021  11:08 AM    Future Appointments   Date Time Provider Judy Mayers   1/7/2021 11:15 AM Jennifer Crisostomo PTA [facility]   1/8/2021  2:15 PM Sara Steel [facility]   1/13/2021  2:15 PM Rena Burger, PT [facility]   1/15/2021  2:15 PM [place], [address]   1/20/2021  2:15 PM Rena Burger, PT [facility]   1/22/2021  2:15 PM [place], [address]

## 2021-01-08 ENCOUNTER — HOSPITAL ENCOUNTER (OUTPATIENT)
Dept: PHYSICAL THERAPY | Age: 51
Discharge: HOME | End: 2021-01-08
Payer: COMMERCIAL

## 2021-01-08 PROCEDURE — 97110 THERAPEUTIC EXERCISES: CPT

## 2021-01-08 PROCEDURE — 97014 ELECTRIC STIMULATION THERAPY: CPT

## 2021-01-08 PROCEDURE — 97112 NEUROMUSCULAR REEDUCATION: CPT

## 2021-01-08 NOTE — PROGRESS NOTES
PT DAILY TREATMENT NOTE     Patient Name: Darryl Espino  Date:2021  : 1970  [x]  Patient  Verified  Payor: Yaya Kasper / Plan: Sherren Counter / Product Type: Commerical /    In time:2:20  Out time: 3:15  Total Treatment Time (min): 55  Visit #: 3 of 10    Medicare/BCBS Only   Total Timed Codes (min):  45 1:1 Treatment Time:  45       Treatment Area: Neck pain [M54.2]  Pain in left shoulder [M25.512]  Pain in thoracic spine [M54.6]    SUBJECTIVE  Pain Level (0-10 scale): 3  Any medication changes, allergies to medications, adverse drug reactions, diagnosis change, or new procedure performed?: [x] No    [] Yes (see summary sheet for update)  Subjective functional status/changes:   [] No changes reported  The pain doesn't change much    OBJECTIVE    Modality rationale: decrease pain and increase tissue extensibility to improve the patients ability to improve ease of function   Min Type Additional Details   10 [] Estim:  []Unatt       []IFC  []Premod                        []Other:  []w/ice   []w/heat  Position:  Location:    [] Estim: []Att    []TENS instruct  []NMES                    []Other:  []w/US   []w/ice   []w/heat  Position:  Location:    []  Traction: [] Cervical       []Lumbar                       [] Prone          []Supine                       []Intermittent   []Continuous Lbs:  [] before manual  [] after manual    []  Ultrasound: []Continuous   [] Pulsed                           []1MHz   []3MHz W/cm2:  Location:    []  Iontophoresis with dexamethasone         Location: [] Take home patch   [] In clinic    []  Ice     []  heat  []  Ice massage  []  Laser   []  Anodyne Position:  Location:    []  Laser with stim  []  Other:  Position:  Location:    []  Vasopneumatic Device Pressure:       [] lo [] med [] hi   Temperature: [] lo [] med [] hi   [x] Skin assessment post-treatment:  [x]intact []redness- no adverse reaction    []redness  adverse reaction:       15 min Therapeutic Exercise:  [] See flow sheet :   Rationale: increase ROM and increase strength to improve the patients ability to improve mobility, activity tolerance    35 min Neuromuscular Re-education:  []  See flow sheet :   Rationale: increase strength, improve coordination and increase proprioception  to improve the patients ability to improve ease of job tasks, household chores    With   [] TE   [] TA   [] neuro   [] other: Patient Education: [x] Review HEP    [] Progressed/Changed HEP based on:   [] positioning   [] body mechanics   [] transfers   [] heat/ice application    [] other:      Other Objective/Functional Measures:      Pain Level (0-10 scale) post treatment: 3    ASSESSMENT/Changes in Function: pain level remains relatively constant in LB. Demo improved stability with March using UE extension    Patient will continue to benefit from skilled PT services to modify and progress therapeutic interventions, address functional mobility deficits, address ROM deficits, address strength deficits, analyze and address soft tissue restrictions, analyze and cue movement patterns, analyze and modify body mechanics/ergonomics, assess and modify postural abnormalities, address imbalance/dizziness and instruct in home and community integration to attain remaining goals. []  See Plan of Care  []  See progress note/recertification  []  See Discharge Summary         Progress towards goals / Updated goals:  - Goal: Pt to demo left seated shoulder flexion AROM of at least 150 deg to improve ease of washing walls and putting away dishes at home. Status at last note/certification: Sh WPLZ 590 deg  Current:   - Goal: Pt to report no more than 3/10 pain on average in shoulders, neck, and low back to improve quality of life.   Status at last note/certification: 5/10 pain at worst in past week  Current:   - Goal: Pt to report sitting/standing tolerance of at least one hour without increased pain to improve ease of work tasks.  Status at last note/certification: 20 min sitting tolerance, 5-10 min standing tolerance  Current:   - Goal: Pt to report FOTO score of at least 61 pts to show improved function and quality of life.   Status at last note/certification: DNBY 39 NSM     PLAN  [x]  Upgrade activities as tolerated     []  Continue plan of care  []  Update interventions per flow sheet       []  Discharge due to:_  []  Other:_      Jake Reyes, AMINA 1/8/2021  2:47 PM    Future Appointments   Date Time Provider Judy Mayers   1/13/2021  2:15 PM Dl Burger, PT [facility]   1/15/2021  2:15 PM [place], [address]

## 2021-01-13 ENCOUNTER — HOSPITAL ENCOUNTER (OUTPATIENT)
Dept: PHYSICAL THERAPY | Age: 51
Discharge: HOME | End: 2021-01-13
Payer: COMMERCIAL

## 2021-01-13 PROCEDURE — 97530 THERAPEUTIC ACTIVITIES: CPT

## 2021-01-13 PROCEDURE — 97110 THERAPEUTIC EXERCISES: CPT

## 2021-01-13 PROCEDURE — 97112 NEUROMUSCULAR REEDUCATION: CPT

## 2021-01-13 NOTE — PROGRESS NOTES
PT DAILY TREATMENT NOTE     Patient Name: Ritika Acevedo  Date:2021  : 1970  [x]  Patient  Verified  Payor: Yolie Noguera / Plan: Maxwell James / Product Type: Commerical /    In time:2:17  Out time: 3:00  Total Treatment Time (min): 43  Visit #: 4 of 10    Medicare/BCBS Only   Total Timed Codes (min):  43 1:1 Treatment Time:  43       Treatment Area: Neck pain [M54.2]  Pain in left shoulder [M25.512]  Pain in thoracic spine [M54.6]    SUBJECTIVE  Pain Level (0-10 scale): 2/10  Any medication changes, allergies to medications, adverse drug reactions, diagnosis change, or new procedure performed?: [x] No    [] Yes (see summary sheet for update)  Subjective functional status/changes:   [] No changes reported  \"I'm doing well, not too much pain today. \"    OBJECTIVE    15 min Therapeutic Exercise:  [] See flow sheet :   Rationale: increase ROM and increase strength to improve the patients ability to improve mobility, activity tolerance    8 min Therapeutic Activity:  []  See flow sheet :   Rationale: increase strength, improve coordination and increase proprioception  to improve the patients ability to functional tasks    20 min Neuromuscular Re-education:  []  See flow sheet :   Rationale: increase strength, improve coordination and increase proprioception  to improve the patients ability to improve ease of job tasks, household chores      With   [] TE   [] TA   [] neuro   [] other: Patient Education: [x] Review HEP    [] Progressed/Changed HEP based on:   [] positioning   [] body mechanics   [] transfers   [] heat/ice application    [] other:      Other Objective/Functional Measures: Continued with exercises per flow sheet. Reassessed goals in preparation for discharge at next visit.      Pain Level (0-10 scale) post treatment: 2/10    ASSESSMENT/Changes in Function:   Functional Gains: Pt reports all activities have become easier since start of therapy  Functional Deficits: Pt continues to have some pain with prolonged sitting and repetitive movements in left shoulder but improved since start of care  % improvement: 75%  Pain   Average: 3/10       Best: 1/10     Worst: 5/10  Pt has steadily progressed with skilled therapy and will be transitioning to independent management of care after next visit with updated HEP due to having surgery - unrelated to current problem list for PT - on 1/22/21. Pt given green Tband and updated HEP and return demonstrated 100% understanding of program.      Patient will continue to benefit from skilled PT services to modify and progress therapeutic interventions, address functional mobility deficits, address ROM deficits, address strength deficits, analyze and address soft tissue restrictions, analyze and cue movement patterns, analyze and modify body mechanics/ergonomics, assess and modify postural abnormalities, address imbalance/dizziness and instruct in home and community integration to attain remaining goals. []  See Plan of Care  []  See progress note/recertification  []  See Discharge Summary         Progress towards goals / Updated goals:  - Goal: Pt to demo left seated shoulder flexion AROM of at least 150 deg to improve ease of washing walls and putting away dishes at home. Status at last note/certification: Sh XBZH 883 deg  Current: progressing - Sh Flex 143 deg  - Goal: Pt to report no more than 3/10 pain on average in shoulders, neck, and low back to improve quality of life. Status at last note/certification: 5/10 pain at worst in past week  Current: progressing - still 5/10 pain at worst but improving  - Goal: Pt to report sitting/standing tolerance of at least one hour without increased pain to improve ease of work tasks.   Status at last note/certification: 20 min sitting tolerance, 5-10 min standing tolerance  Current: progressing - 45 min sitting tolerance, 15-20 min standing tolerance  - Goal: Pt to report FOTO score of at least 61 pts to show improved function and quality of life.   Status at last note/certification: VUVC 12 SEYMOUR   Current: reassess at MD note    PLAN  [x]  Upgrade activities as tolerated     []  Continue plan of care  []  Update interventions per flow sheet       []  Discharge due to:_  [x]  Other:_FOTO, D/C next visit      Gloriajean Gosselin Daughtry, PT 1/13/2021  2:47 PM    Future Appointments   Date Time Provider Judy Mayers   1/15/2021  2:15 PM Uday Hurtado [facility]

## 2021-01-15 ENCOUNTER — HOSPITAL ENCOUNTER (OUTPATIENT)
Dept: PHYSICAL THERAPY | Age: 51
Discharge: HOME | End: 2021-01-15
Payer: COMMERCIAL

## 2021-01-15 PROCEDURE — 97530 THERAPEUTIC ACTIVITIES: CPT

## 2021-01-15 NOTE — PROGRESS NOTES
PT DISCHARGE DAILY NOTE AND [id number]    Patient name: Vikki Gowers Start of Care: 2020   Referral source: Thanh Giraldo MD : 1970   Medical/Treatment Diagnosis: Neck pain [M54.2]  Pain in left shoulder [M25.512]  Pain in thoracic spine [M54.6] Onset Date:10/26/2020     Prior Hospitalization: see medical history Provider#: [id number]   Medications: Verified on Patient Summary List    Comorbidities:  HTN, DM II  Prior Level of Function:functionally independent,  for Mario Bo    Visits from [facility]: 14    Missed Visits: 0    Reporting Period : 2020 to 1/15/2021    Date:1/15/2021  :   [x]  Patient  Verified  Payor: Patricia Levin / Plan: [employer] / Product Type: Commerical /    In time:2:15  Out time:2:30  Total Treatment Time (min): 15  Visit #: 5 of 10    Medicare/BCBS Only   Total Timed Codes (min):  15 1:1 Treatment Time:  15       SUBJECTIVE  Pain Level (0-10 scale): 2/10   Any medication changes, allergies to medications, adverse drug reactions, diagnosis change, or new procedure performed?: [x] No    [] Yes (see summary sheet for update)  Subjective functional status/changes:   [] No changes reported  Can I just do the discharge today? OBJECTIVE    15 min Therapeutic Activity:  []  See flow sheet :  FOTO   Rationale: increase ROM and increase strength  to improve the patients ability to improve ease of function    With   [] TE   [] TA   [] neuro   [] other: Patient Education: [x] Review HEP    [] Progressed/Changed HEP based on:   [] positioning   [] body mechanics   [] transfers   [] heat/ice application    [] other:      Other Objective/Functional Measures: Pt declined exercise interventions today. Performed reassessment for discharge.       Pain Level (0-10 scale) post treatment: 2/10    Summary of Care:  - Goal: Pt to demo left seated shoulder flexion AROM of at least 150 deg to improve ease of washing yin and putting away dishes at home. Status at last note/certification: Sh UOBJ 849 deg  Current: progressing - Sh Flex 143 deg  - Goal: Pt to report no more than 3/10 pain on average in shoulders, neck, and low back to improve quality of life. Status at last note/certification: 5/10 pain at worst in past week  Current: progressing - still 5/10 pain at worst but improving  - Goal: Pt to report sitting/standing tolerance of at least one hour without increased pain to improve ease of work tasks. Status at last note/certification: 20 min sitting tolerance, 5-10 min standing tolerance  Current: progressing - 45 min sitting tolerance, 15-20 min standing tolerance  - Goal: Pt to report FOTO score of at least 61 pts to show improved function and quality of life. Status at last note/certification: JLBV 69 KSN   Current: progressing - FOTO 56 pts    ASSESSMENT/Changes in Function: Patient has attended 14 sessions of PT  including evaluation. At time of last visit, Pt reported the following:    Functional Gains: Pt reports all activities have become easier since start of therapy  Functional Deficits: Pt continues to have some pain with prolonged sitting and repetitive movements in left shoulder but improved since start of care  % improvement: 75%  Pain   Average: 3/10                  Best: 1/10                Worst: 5/10  Pt has steadily progressed with skilled therapy and will be transitioning to independent management of care with updated HEP due to having surgery - unrelated to current problem list for PT - on 1/22/21. Pt given green Tband and updated HEP and return demonstrated 100% understanding of program.  Pt will be discharged at this time.       Thank you for this referral!      PLAN  [x]Discontinue therapy: [x]Patient has reached or is progressing toward set goals      []Patient is non-compliant or has abdicated      []Due to lack of appreciable progress towards set goals    Radha Burger, PT 1/15/2021  2:16 PM

## 2021-01-15 NOTE — PROGRESS NOTES
Physical Therapy Discharge Instructions      [facility] SUSHANT LAURELJessika AMARO [facility], [address] [place] [address]  [phone] fax    Patient: Po Cabrera  : 1970      Continue Home Exercise Program 1-2 times per day for 4 weeks, then decrease to 3 times per week      Continue with    [] Ice  as needed  times per day     [] Heat           Follow up with MD:     [] Upon completion of therapy     [x] As needed      Recommendations:     [x]   Return to activity with home program    []   Return to activity with the following modifications:       []Post Rehab Program    []Join Independent aquatic program     []Return to/join local gym        Additional Comments:           Tiana Fernandez, AMINA 1/15/2021 2:36 PM

## 2021-01-20 ENCOUNTER — APPOINTMENT (OUTPATIENT)
Dept: PHYSICAL THERAPY | Age: 51
End: 2021-01-20
Payer: COMMERCIAL

## 2021-01-22 ENCOUNTER — APPOINTMENT (OUTPATIENT)
Dept: PHYSICAL THERAPY | Age: 51
End: 2021-01-22
Payer: COMMERCIAL

## 2021-01-27 PROBLEM — I10 HYPERTENSION: Status: ACTIVE | Noted: 2021-01-27

## 2021-01-27 PROBLEM — D32.0 MENINGIOMA, CEREBRAL (HCC): Status: ACTIVE | Noted: 2021-01-27

## 2021-01-27 PROBLEM — E78.00 HIGH CHOLESTEROL: Status: ACTIVE | Noted: 2021-01-27

## 2022-03-19 PROBLEM — I10 HYPERTENSION: Status: ACTIVE | Noted: 2021-01-27

## 2022-03-19 PROBLEM — D32.0 MENINGIOMA, CEREBRAL (HCC): Status: ACTIVE | Noted: 2021-01-27

## 2022-03-20 PROBLEM — E78.00 HIGH CHOLESTEROL: Status: ACTIVE | Noted: 2021-01-27

## 2024-02-28 ENCOUNTER — OFFICE VISIT (OUTPATIENT)
Age: 54
End: 2024-02-28
Payer: COMMERCIAL

## 2024-02-28 VITALS
RESPIRATION RATE: 18 BRPM | TEMPERATURE: 98.3 F | HEART RATE: 65 BPM | WEIGHT: 143.2 LBS | HEIGHT: 61 IN | BODY MASS INDEX: 27.03 KG/M2 | OXYGEN SATURATION: 98 %

## 2024-02-28 DIAGNOSIS — M54.41 CHRONIC BILATERAL LOW BACK PAIN WITH BILATERAL SCIATICA: ICD-10-CM

## 2024-02-28 DIAGNOSIS — M54.42 CHRONIC BILATERAL LOW BACK PAIN WITH BILATERAL SCIATICA: ICD-10-CM

## 2024-02-28 DIAGNOSIS — R20.0 NUMBNESS AND TINGLING IN BOTH HANDS: Primary | ICD-10-CM

## 2024-02-28 DIAGNOSIS — M54.2 NECK PAIN: ICD-10-CM

## 2024-02-28 DIAGNOSIS — R20.2 NUMBNESS AND TINGLING IN BOTH HANDS: Primary | ICD-10-CM

## 2024-02-28 DIAGNOSIS — G89.29 CHRONIC BILATERAL LOW BACK PAIN WITH BILATERAL SCIATICA: ICD-10-CM

## 2024-02-28 DIAGNOSIS — M87.9 OSTEONECROSIS OF LEFT HIP (HCC): ICD-10-CM

## 2024-02-28 PROCEDURE — 99204 OFFICE O/P NEW MOD 45 MIN: CPT | Performed by: PHYSICAL MEDICINE & REHABILITATION

## 2024-02-28 RX ORDER — FREMANEZUMAB-VFRM 225 MG/1.5ML
INJECTION SUBCUTANEOUS
COMMUNITY
Start: 2023-12-07

## 2024-02-28 RX ORDER — RIMEGEPANT SULFATE 75 MG/75MG
TABLET, ORALLY DISINTEGRATING ORAL
COMMUNITY
Start: 2023-12-07

## 2024-02-28 NOTE — PROGRESS NOTES
Erica ROGELIO eBnnettgeovanni presents today for   Chief Complaint   Patient presents with    Back Problem    Pain    Back Pain       Is someone accompanying this pt? no    Is the patient using any DME equipment during OV? no    Depression Screening:       No data to display                Learning Assessment:  Failed to redirect to the Timeline version of the [employer] SmartLink.    Abuse Screening:       No data to display                Fall Risk  Failed to redirect to the Timeline version of the [employer] SmartLink.    OPIOID RISK TOOL  Failed to redirect to the Timeline version of the [employer] SmartLink.    Coordination of Care:  1. Have you been to the ER, urgent care clinic since your last visit? no  Hospitalized since your last visit? no    2. Have you seen or consulted any other health care providers outside of the [facility] since your last visit? no Include any pap smears or colon screening. no    
2+ symmetric DTRs throughout UE/LE  Gait: normal   Upper tract signs:  Gonzalez's negative ?     Musculoskeletal: Cervical Exam   Alignment: Normal  Atrophy: None     Tenderness to Palpation:   Cervical paraspinals: Positive  Cervical spinous processes: Negative  Upper thoracic paraspinals: Positive  Upper thoracic spinous processes: Negative  Upper trapezius:  Positive    Cervical ROM: Normal   Shoulder ROM: No reproduction of pain with movement     Special Tests    + carpal compression left   Neg tinels bilateral             Musculoskeletal: Lumbar Exam     Inspection:   Alignment: Normal  Atrophy: None       Tenderness to Palpation:   Lumbar paraspinals Positive  Lumbar spinous processes Negative  SI Joint:  Positive  Gluteal:Negative   Greater trochanter: Negative  IT Band:Negative    ROM:   Lumbar ROM: Normal  Lumbar facet loading: Negative  Hip ROM: No reproduction of pain with movement     Special Tests      Slump test: Negative  SLR: Negative  FATEMEH: Positive- left  FADIR: Positive- left                     Assessment:   1. Numbness and tingling in both hands  -     XR CERVICAL SPINE (2-3 VIEWS); Future  -     EMG TWO EXTREMITIES UPPER; Future  2. Neck pain  -     XR CERVICAL SPINE (2-3 VIEWS); Future  -     EMG TWO EXTREMITIES UPPER; Future  3. Chronic bilateral low back pain with bilateral sciatica  -     XR LUMBAR SPINE (2-3 VIEWS); Future  4. Osteonecrosis of left hip (HCC)       Plan:      -Physical therapy -  home exercises provided for cervical and lumbar spine  -Medications - NA. Counseled regarding side effects and appropriate administration of medications.    -Diagnostics/Imaging -   -x-ray cervial spine  -x-ray lumbar spine  -EMG bilateral upper extremities evaluate cervical radiculopathy vs carpal tunnel    -Injections - NA   -Lifestyle - Encourage regular exercises   -Education - The patient's diagnosis, prognosis and treatment options were discussed today. All questions were answered.